# Patient Record
Sex: FEMALE | Race: WHITE | NOT HISPANIC OR LATINO | Employment: UNEMPLOYED | URBAN - METROPOLITAN AREA
[De-identification: names, ages, dates, MRNs, and addresses within clinical notes are randomized per-mention and may not be internally consistent; named-entity substitution may affect disease eponyms.]

---
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
## 2024-12-26 ENCOUNTER — TELEPHONE (OUTPATIENT)
Dept: FAMILY MEDICINE CLINIC | Facility: CLINIC | Age: 19
End: 2024-12-26

## 2024-12-26 NOTE — TELEPHONE ENCOUNTER
Caller: Tressa Caballero    Relationship to patient: Mother    Best call back number: [phone]    Chief complaint: NEW PATIENT-PATIENT HAS ANT PHOSPHOLIPID SYNDROME, RINGING IN EAR    Type of visit: NEW PATIENT    Additional notes:OBINNA'S MOM, TRESSA CABALLERO IS A CURRENT PATIENT OF DR VU AND IS WANTING TO KNOW IF SHE IS WILLING TO TAKE HER DAUGHTER ON AS A NEW PATIENT. HER PRIMARY REASON FOR ESTABLISHING WOULD BE TO DISCUSS HER DIAGNOSIS OF ANTIPHOSPHOLIPID SYNDROME(PATIENT DOES HAVE AND ENDO) AND RINGING IN EAR. PLEASE CALLBACK WITH DECISION.

## 2024-12-27 PROBLEM — H93.8X2: Status: ACTIVE | Noted: 2024-12-27

## 2025-08-01 ENCOUNTER — OFFICE VISIT (OUTPATIENT)
Dept: FAMILY MEDICINE CLINIC | Facility: CLINIC | Age: 20
End: 2025-08-01
Payer: COMMERCIAL

## 2025-08-01 VITALS
HEIGHT: 66 IN | OXYGEN SATURATION: 98 % | TEMPERATURE: 97.6 F | HEART RATE: 73 BPM | BODY MASS INDEX: 19.13 KG/M2 | DIASTOLIC BLOOD PRESSURE: 64 MMHG | SYSTOLIC BLOOD PRESSURE: 104 MMHG | WEIGHT: 119 LBS

## 2025-08-01 DIAGNOSIS — D68.61 ANTIPHOSPHOLIPID ANTIBODY SYNDROME: Primary | ICD-10-CM

## 2025-08-01 DIAGNOSIS — Z13.29 SCREENING FOR THYROID DISORDER: ICD-10-CM

## 2025-08-01 DIAGNOSIS — Z13.1 SCREENING FOR DIABETES MELLITUS: ICD-10-CM

## 2025-08-01 DIAGNOSIS — R19.8 IRREGULAR BOWEL HABITS: ICD-10-CM

## 2025-08-01 DIAGNOSIS — Z13.0 SCREENING, ANEMIA, DEFICIENCY, IRON: ICD-10-CM

## 2025-08-01 DIAGNOSIS — Z13.220 SCREENING FOR LIPID DISORDERS: ICD-10-CM

## 2025-08-01 DIAGNOSIS — R76.8 POSITIVE ANA (ANTINUCLEAR ANTIBODY): ICD-10-CM

## 2025-08-01 PROBLEM — N94.6 DYSMENORRHEA IN ADOLESCENT: Status: RESOLVED | Noted: 2019-03-31 | Resolved: 2025-08-01

## 2025-08-01 PROBLEM — Z30.09 CONTRACEPTIVE EDUCATION: Status: RESOLVED | Noted: 2019-03-31 | Resolved: 2025-08-01

## 2025-08-01 PROBLEM — N94.2 VAGINISMUS: Status: RESOLVED | Noted: 2019-03-31 | Resolved: 2025-08-01

## 2025-08-01 PROBLEM — B34.9 ACUTE VIRAL SYNDROME: Status: RESOLVED | Noted: 2020-02-17 | Resolved: 2025-08-01

## 2025-08-01 NOTE — PROGRESS NOTES
Chief Complaint  Establish Care (NEW PT ESTABLISHING TODAY WITH DR Singletary) and GI Problem (After eating has bloating and   feel  uncomfortable    almost every day  no issues with constipation or diarrhea)    Subjective        Eufemia Caballero presents to [facility]  GI Problem      She has had positive NEIL, SSA antibodies and antiphospholipid antibodies.  She was on hydrochloric when for about 2 years around 2018 but has been off of it since 2020, vascular symptoms improved.  [facility]. Booker [facility] in 2023.  Showed positive SSA IgG antibody, generally asymptomatic.  Still seeing rheumatology annually  History of Present Illness  The patient presents for evaluation of bloating and gastrointestinal issues.    Gastrointestinal Issues  She has experienced painful bloating for months, initially attributing it to diet but now suspects birth control pills taken in 2024. The bloating is severe, causing abdominal protrusion and hardness. Pain is rated 7-8/10, located in the lower abdomen bilaterally. Irregular bowel movements occur every 2-3 days without blood or mucus, and defecation does not alleviate pain. Bloating worsens postprandially and during menstruation. Despite a healthy diet and busy lifestyle, she struggles with regular exercise and has gained weight. She recalls being slimmer during college with a less healthy diet. She takes Align probiotics regularly and has no known medication allergies.  - Onset: Experienced painful bloating for months.  - Location: Lower abdomen bilaterally.  - Duration: Persistent for months.  - Character: Severe bloating causing abdominal protrusion and hardness; pain rated 7-8/10.  - Alleviating/Aggravating Factors: Bloating worsens postprandially and during menstruation; defecation does not alleviate pain.  - Timing: Irregular bowel movements every 2-3 days.  - Severity: Severe bloating and pain rated 7-8/10.    Antiphospholipid  "Syndrome  She has a history of antiphospholipid syndrome, treated with Plaquenil from 2018 to 2020, currently in remission. She sees a rheumatologist annually for blood work, last visit in 2024. No symptoms of dry eyes/throat, skin tension, or nail changes.    Menstrual and Vaginal Issues  She has a history of irregular menstrual cycles and vaginal spasms, now resolved. Difficulty using tampons at age 13 due to small body size has improved. She is not currently sexually active, has no history of STDs, uses protection during sexual activity, and is not on oral contraceptives. Previously on progesterone-only birth control in 2021 and briefly in 2024, discontinued due to bloating, weight gain, and acne. No history of yeast infections.    SOCIAL HISTORY  - Non-smoker  - Drinks alcohol, 1-2 drinks/week  - No recreational drug use  - Sexually active with males    FAMILY HISTORY  - Mother: brain aneurysm  - Father: prostate cancer  - No family history of breast, ovarian, colon cancer, blood clots, stroke, heart attack, autoimmune diseases, hypothyroidism, or Hashimoto's hypothyroidism    ALLERGIES  - No known allergies    MEDICATIONS  Current: Align probiotics  Past: Plaquenil       Objective   Vital Signs:  /64   Pulse 73   Temp 97.6 °F (36.4 °C)   Ht 167.6 cm (66\")   Wt 54 kg (119 lb)   SpO2 98%   BMI 19.21 kg/m²   Estimated body mass index is 19.21 kg/m² as calculated from the following:    Height as of this encounter: 167.6 cm (66\").    Weight as of this encounter: 54 kg (119 lb).    BMI is within normal parameters. No other follow-up for BMI required.      Physical Exam  Vitals and nursing note reviewed.   Constitutional:       General: She is not in acute distress.     Appearance: She is well-developed.   HENT:      Head: Normocephalic.      Nose: Nose normal.   Cardiovascular:      Rate and Rhythm: Normal rate and regular rhythm.      Heart sounds: Normal heart sounds. No murmur heard.  Pulmonary:      " Effort: Pulmonary effort is normal. No respiratory distress.      Breath sounds: Normal breath sounds.   Musculoskeletal:         General: Normal range of motion.   Skin:     General: Skin is warm and dry.      Findings: No rash.   Neurological:      Mental Status: She is alert and oriented to person, place, and time.   Psychiatric:         Behavior: Behavior normal.         Thought Content: Thought content normal.         Judgment: Judgment normal.        Physical Exam  Examined thyroid, lungs, heart, and abdomen.      Result Review :                  Assessment and Plan   Diagnoses and all orders for this visit:    1. Antiphospholipid antibody syndrome (Primary)  -     Ambulatory Referral to Gastroenterology    2. Positive NEIL (antinuclear antibody)  -     Ambulatory Referral to Gastroenterology    3. Irregular bowel habits  -     Ambulatory Referral to Gastroenterology    4. Screening for diabetes mellitus    5. Screening for lipid disorders    6. Screening for thyroid disorder    7. Screening, anemia, deficiency, iron       Pleasant 20-year-old female here as a new patient.  See HPI for summary above  Assessment & Plan  1. Bloating and gastrointestinal issues, chronic problem that is been present through most of the spring.  She attributes this to starting a progesterone only birth control that seems to have started the symptoms and it has not resolved since then.  She only took the OCP for 1 month.  It does sound like she is concerned about her weight and bloating, although it does sound more than her usual.  It does also sound like she is eating more healthy in general, encouraged her that the weight gain she is experienced is actually good.  I actually would not be opposed if she was to gain an additional 5 pounds especially if it was through protein, healthy carbs and vegetables.  -Plan:  - Advised food journal to track diet and symptoms  - Recommended referral to GI for consideration for colonoscopy due to  autoimmune history  - Suggested tests for celiac disease, anemia, and thyroid function, she had some blood work done with her gynecologist so I will review these records prior to placing orders for blood work to not duplicate test  - Encouraged to continue scheduled ultrasound with GYN    2. History of antiphospholipid syndrome  - Treated with Plaquenil from 2018 to 2020, currently in remission  - Annual rheumatologist visits for blood work, last in 2024    3. Irregular menstrual cycles  - History of irregular menstrual cycles and vaginal spasms, now resolved    Follow-up  - Scheduled follow-up in 4 weeks    I spent 42 minutes caring for Eufemia on this date of service. This time includes time spent by me in the following activities:preparing for the visit, reviewing tests, performing a medically appropriate examination and/or evaluation , counseling and educating the patient/family/caregiver, referring and communicating with other health care professionals , and documenting information in the medical record  Follow Up   Return in about 4 weeks (around 8/29/2025), or if symptoms worsen or fail to improve, for Recheck GI issues .  Patient was given instructions and counseling regarding her condition or for health maintenance advice. Please see specific information pulled into the AVS if appropriate.         Hortensia Regan MD      Patient or patient representative verbalized consent for the use of Ambient Listening during the visit with  Hortensia Regan MD for chart documentation. 8/1/2025  12:54 EDT